# Patient Record
Sex: FEMALE | Race: WHITE | NOT HISPANIC OR LATINO | Employment: OTHER | URBAN - METROPOLITAN AREA
[De-identification: names, ages, dates, MRNs, and addresses within clinical notes are randomized per-mention and may not be internally consistent; named-entity substitution may affect disease eponyms.]

---

## 2017-02-27 PROBLEM — S93.401A RIGHT ANKLE SPRAIN: Status: ACTIVE | Noted: 2017-02-27

## 2020-06-25 ENCOUNTER — TELEPHONE (OUTPATIENT)
Dept: FAMILY MEDICINE | Facility: CLINIC | Age: 85
End: 2020-06-25

## 2020-06-25 ENCOUNTER — TELEPHONE (OUTPATIENT)
Dept: CARDIOLOGY | Facility: CLINIC | Age: 85
End: 2020-06-25

## 2020-06-25 NOTE — TELEPHONE ENCOUNTER
----- Message from Yudy Nazario sent at 6/25/2020  9:18 AM CDT -----  Regarding: Medical release form  Contact: pt daughter - Ms Reynaga  Stated she call ing for the office to send a medical release for to [facility] fax [phone] or call [phone]  office for pt medical records, she can be reached at [phone] Thanks

## 2020-06-25 NOTE — TELEPHONE ENCOUNTER
----- Message from Pretty Gonzalez sent at 6/25/2020  9:09 AM CDT -----  Regarding: records  Contact: Daughters  Type: Needs Medical Advice    Who Called:  Daughter,     Best Call Back Number: [phone]- requesting a call back    Additional Information:   Daughter, [place], called to state that patient's past cardiologist and family med told her Dr. Daniel has to request her medical records.    Daughter requesting to send med rec requests to    Dr. Mike Quispe  Fax: [phone]  Phone: [phone]    [facility] ( saw Dr. Yusuf but he retired)  Fax: [phone]  Phone: [phone]

## 2020-06-28 RX ORDER — HALOPERIDOL 2 MG/ML
SOLUTION ORAL
COMMUNITY
Start: 2020-05-26

## 2020-06-28 RX ORDER — METOLAZONE 2.5 MG/1
TABLET ORAL
COMMUNITY
Start: 2020-05-29

## 2020-06-28 RX ORDER — MORPHINE SULFATE 20 MG/ML
SOLUTION ORAL
COMMUNITY
Start: 2020-03-27

## 2020-06-28 RX ORDER — TEMAZEPAM 15 MG/1
CAPSULE ORAL
COMMUNITY
Start: 2020-05-22

## 2020-06-28 RX ORDER — LORAZEPAM 2 MG/ML
CONCENTRATE ORAL
COMMUNITY
Start: 2020-05-14

## 2020-06-28 RX ORDER — FUROSEMIDE 40 MG/1
40 TABLET ORAL 2 TIMES DAILY
COMMUNITY
Start: 2020-06-12

## 2020-06-28 RX ORDER — CARVEDILOL 25 MG/1
25 TABLET ORAL 2 TIMES DAILY
COMMUNITY
Start: 2020-06-12

## 2020-06-28 RX ORDER — MIRTAZAPINE 15 MG/1
15 TABLET, FILM COATED ORAL NIGHTLY
COMMUNITY
Start: 2020-05-29

## 2020-06-28 RX ORDER — HYOSCYAMINE SULFATE 0.12 MG/1
TABLET SUBLINGUAL
COMMUNITY
Start: 2020-05-29

## 2020-06-28 RX ORDER — ONDANSETRON 8 MG/1
TABLET, ORALLY DISINTEGRATING ORAL
COMMUNITY
Start: 2020-05-26

## 2020-06-28 RX ORDER — MELATONIN 5 MG
1 TABLET, SUBLINGUAL SUBLINGUAL NIGHTLY
COMMUNITY
Start: 2020-05-22

## 2020-06-29 ENCOUNTER — TELEPHONE (OUTPATIENT)
Dept: FAMILY MEDICINE | Facility: CLINIC | Age: 85
End: 2020-06-29

## 2020-06-29 NOTE — TELEPHONE ENCOUNTER
----- Message from Addis Monzon MD sent at 6/28/2020  9:34 PM CDT -----  Regarding: appt  Daughter has already called and spoken to alan about records.     Please confirm who is managing her medications.     Appears previously her primary care (dr. Amin) was writing and last fills were hematology/oncology. Please advise if her heme/onc physician will be writing all the medications (temazepam, lorazepam, morphine).     Please contact them before the appointment. The patient is 89 years old. I would not want her to come to the office with potential risk of contact of sick patients if this is a hard stop.

## 2020-06-29 NOTE — TELEPHONE ENCOUNTER
Patients daughter states that an GOMEZ was signed last week. Also [facility] is writing the below medications for patient.     temazepam, lorazepam, morphine